# Patient Record
Sex: MALE | Race: WHITE | NOT HISPANIC OR LATINO | Employment: FULL TIME | URBAN - METROPOLITAN AREA
[De-identification: names, ages, dates, MRNs, and addresses within clinical notes are randomized per-mention and may not be internally consistent; named-entity substitution may affect disease eponyms.]

---

## 2021-04-17 ENCOUNTER — IMMUNIZATION (OUTPATIENT)
Dept: PRIMARY CARE CLINIC | Facility: CLINIC | Age: 46
End: 2021-04-17

## 2021-04-17 DIAGNOSIS — Z23 NEED FOR VACCINATION: Primary | ICD-10-CM

## 2021-04-17 PROCEDURE — 91300 PR SARS-COV- 2 COVID-19 VACCINE, NO PRSV, 30MCG/0.3ML, IM: ICD-10-PCS | Mod: S$GLB,,, | Performed by: INTERNAL MEDICINE

## 2021-04-17 PROCEDURE — 0001A PR IMMUNIZ ADMIN, SARS-COV-2 COVID-19 VACC, 30MCG/0.3ML, 1ST DOSE: ICD-10-PCS | Mod: CV19,S$GLB,, | Performed by: INTERNAL MEDICINE

## 2021-04-17 PROCEDURE — 0001A PR IMMUNIZ ADMIN, SARS-COV-2 COVID-19 VACC, 30MCG/0.3ML, 1ST DOSE: CPT | Mod: CV19,S$GLB,, | Performed by: INTERNAL MEDICINE

## 2021-04-17 PROCEDURE — 91300 PR SARS-COV- 2 COVID-19 VACCINE, NO PRSV, 30MCG/0.3ML, IM: CPT | Mod: S$GLB,,, | Performed by: INTERNAL MEDICINE

## 2021-04-17 RX ADMIN — Medication 0.3 ML: at 03:04

## 2021-05-08 ENCOUNTER — IMMUNIZATION (OUTPATIENT)
Dept: PRIMARY CARE CLINIC | Facility: CLINIC | Age: 46
End: 2021-05-08

## 2021-05-08 DIAGNOSIS — Z23 NEED FOR VACCINATION: Primary | ICD-10-CM

## 2021-05-08 PROCEDURE — 0002A PR IMMUNIZ ADMIN, SARS-COV-2 COVID-19 VACC, 30MCG/0.3ML, 2ND DOSE: ICD-10-PCS | Mod: CV19,S$GLB,, | Performed by: INTERNAL MEDICINE

## 2021-05-08 PROCEDURE — 91300 PR SARS-COV- 2 COVID-19 VACCINE, NO PRSV, 30MCG/0.3ML, IM: ICD-10-PCS | Mod: S$GLB,,, | Performed by: INTERNAL MEDICINE

## 2021-05-08 PROCEDURE — 0002A PR IMMUNIZ ADMIN, SARS-COV-2 COVID-19 VACC, 30MCG/0.3ML, 2ND DOSE: CPT | Mod: CV19,S$GLB,, | Performed by: INTERNAL MEDICINE

## 2021-05-08 PROCEDURE — 91300 PR SARS-COV- 2 COVID-19 VACCINE, NO PRSV, 30MCG/0.3ML, IM: CPT | Mod: S$GLB,,, | Performed by: INTERNAL MEDICINE

## 2021-05-08 RX ADMIN — Medication 0.3 ML: at 08:05

## 2023-11-20 ENCOUNTER — OFFICE VISIT (OUTPATIENT)
Dept: PRIMARY CARE CLINIC | Facility: CLINIC | Age: 48
End: 2023-11-20
Payer: COMMERCIAL

## 2023-11-20 ENCOUNTER — HOSPITAL ENCOUNTER (OUTPATIENT)
Dept: RADIOLOGY | Facility: HOSPITAL | Age: 48
Discharge: HOME | End: 2023-11-20
Attending: STUDENT IN AN ORGANIZED HEALTH CARE EDUCATION/TRAINING PROGRAM
Payer: COMMERCIAL

## 2023-11-20 VITALS
DIASTOLIC BLOOD PRESSURE: 72 MMHG | WEIGHT: 174.81 LBS | TEMPERATURE: 98 F | HEART RATE: 48 BPM | SYSTOLIC BLOOD PRESSURE: 100 MMHG | OXYGEN SATURATION: 99 % | BODY MASS INDEX: 25.89 KG/M2 | HEIGHT: 69 IN

## 2023-11-20 DIAGNOSIS — G89.29 CHRONIC RIGHT SHOULDER PAIN: ICD-10-CM

## 2023-11-20 DIAGNOSIS — G89.29 CHRONIC RIGHT SHOULDER PAIN: Primary | ICD-10-CM

## 2023-11-20 DIAGNOSIS — M54.12 CERVICAL RADICULOPATHY: ICD-10-CM

## 2023-11-20 DIAGNOSIS — M25.511 CHRONIC RIGHT SHOULDER PAIN: ICD-10-CM

## 2023-11-20 DIAGNOSIS — M25.511 CHRONIC RIGHT SHOULDER PAIN: Primary | ICD-10-CM

## 2023-11-20 PROCEDURE — 99999 PR PBB SHADOW E&M-EST. PATIENT-LVL V: ICD-10-PCS | Mod: PBBFAC,,, | Performed by: STUDENT IN AN ORGANIZED HEALTH CARE EDUCATION/TRAINING PROGRAM

## 2023-11-20 PROCEDURE — 72040 X-RAY EXAM NECK SPINE 2-3 VW: CPT | Mod: 26,,, | Performed by: RADIOLOGY

## 2023-11-20 PROCEDURE — 73030 XR SHOULDER COMPLETE 2 OR MORE VIEWS RIGHT: ICD-10-PCS | Mod: 26,RT,, | Performed by: RADIOLOGY

## 2023-11-20 PROCEDURE — 73030 X-RAY EXAM OF SHOULDER: CPT | Mod: TC,PN,RT

## 2023-11-20 PROCEDURE — 3008F PR BODY MASS INDEX (BMI) DOCUMENTED: ICD-10-PCS | Mod: CPTII,S$GLB,, | Performed by: STUDENT IN AN ORGANIZED HEALTH CARE EDUCATION/TRAINING PROGRAM

## 2023-11-20 PROCEDURE — 73030 X-RAY EXAM OF SHOULDER: CPT | Mod: 26,RT,, | Performed by: RADIOLOGY

## 2023-11-20 PROCEDURE — 3074F PR MOST RECENT SYSTOLIC BLOOD PRESSURE < 130 MM HG: ICD-10-PCS | Mod: CPTII,S$GLB,, | Performed by: STUDENT IN AN ORGANIZED HEALTH CARE EDUCATION/TRAINING PROGRAM

## 2023-11-20 PROCEDURE — 1159F PR MEDICATION LIST DOCUMENTED IN MEDICAL RECORD: ICD-10-PCS | Mod: CPTII,S$GLB,, | Performed by: STUDENT IN AN ORGANIZED HEALTH CARE EDUCATION/TRAINING PROGRAM

## 2023-11-20 PROCEDURE — 3074F SYST BP LT 130 MM HG: CPT | Mod: CPTII,S$GLB,, | Performed by: STUDENT IN AN ORGANIZED HEALTH CARE EDUCATION/TRAINING PROGRAM

## 2023-11-20 PROCEDURE — 3078F PR MOST RECENT DIASTOLIC BLOOD PRESSURE < 80 MM HG: ICD-10-PCS | Mod: CPTII,S$GLB,, | Performed by: STUDENT IN AN ORGANIZED HEALTH CARE EDUCATION/TRAINING PROGRAM

## 2023-11-20 PROCEDURE — 3078F DIAST BP <80 MM HG: CPT | Mod: CPTII,S$GLB,, | Performed by: STUDENT IN AN ORGANIZED HEALTH CARE EDUCATION/TRAINING PROGRAM

## 2023-11-20 PROCEDURE — 72040 X-RAY EXAM NECK SPINE 2-3 VW: CPT | Mod: TC,PN

## 2023-11-20 PROCEDURE — 99214 OFFICE O/P EST MOD 30 MIN: CPT | Mod: S$GLB,,, | Performed by: STUDENT IN AN ORGANIZED HEALTH CARE EDUCATION/TRAINING PROGRAM

## 2023-11-20 PROCEDURE — 3008F BODY MASS INDEX DOCD: CPT | Mod: CPTII,S$GLB,, | Performed by: STUDENT IN AN ORGANIZED HEALTH CARE EDUCATION/TRAINING PROGRAM

## 2023-11-20 PROCEDURE — 99999 PR PBB SHADOW E&M-EST. PATIENT-LVL V: CPT | Mod: PBBFAC,,, | Performed by: STUDENT IN AN ORGANIZED HEALTH CARE EDUCATION/TRAINING PROGRAM

## 2023-11-20 PROCEDURE — 1159F MED LIST DOCD IN RCRD: CPT | Mod: CPTII,S$GLB,, | Performed by: STUDENT IN AN ORGANIZED HEALTH CARE EDUCATION/TRAINING PROGRAM

## 2023-11-20 PROCEDURE — 72040 XR CERVICAL SPINE 2 OR 3 VIEWS: ICD-10-PCS | Mod: 26,,, | Performed by: RADIOLOGY

## 2023-11-20 PROCEDURE — 99214 PR OFFICE/OUTPT VISIT, EST, LEVL IV, 30-39 MIN: ICD-10-PCS | Mod: S$GLB,,, | Performed by: STUDENT IN AN ORGANIZED HEALTH CARE EDUCATION/TRAINING PROGRAM

## 2023-11-20 RX ORDER — BUPROPION HYDROCHLORIDE 150 MG/1
150 TABLET ORAL DAILY
Qty: 7 TABLET | Refills: 0 | Status: SHIPPED | OUTPATIENT
Start: 2023-11-20 | End: 2023-11-20 | Stop reason: ALTCHOICE

## 2023-11-20 RX ORDER — BUPRENORPHINE HYDROCHLORIDE, NALOXONE HYDROCHLORIDE 8; 2 MG/1; MG/1
FILM, SOLUBLE BUCCAL; SUBLINGUAL
COMMUNITY
Start: 2023-08-25

## 2023-11-20 RX ORDER — SILDENAFIL 25 MG/1
25 TABLET, FILM COATED ORAL DAILY PRN
COMMUNITY
Start: 2023-08-25

## 2023-11-20 RX ORDER — GABAPENTIN 800 MG/1
400 TABLET ORAL DAILY
COMMUNITY
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
## 2023-11-24 ENCOUNTER — OFFICE VISIT (OUTPATIENT)
Dept: SPORTS MEDICINE | Facility: CLINIC | Age: 48
End: 2023-11-24
Payer: COMMERCIAL

## 2023-11-24 VITALS
WEIGHT: 176.38 LBS | SYSTOLIC BLOOD PRESSURE: 118 MMHG | HEART RATE: 83 BPM | HEIGHT: 69 IN | DIASTOLIC BLOOD PRESSURE: 53 MMHG | BODY MASS INDEX: 26.12 KG/M2

## 2023-11-24 DIAGNOSIS — M54.2 CERVICALGIA: ICD-10-CM

## 2023-11-24 DIAGNOSIS — M54.12 CERVICAL RADICULOPATHY: ICD-10-CM

## 2023-11-24 DIAGNOSIS — M54.12 CERVICAL RADICULOPATHY AT C6: Primary | ICD-10-CM

## 2023-11-24 DIAGNOSIS — G89.29 CHRONIC RIGHT SHOULDER PAIN: ICD-10-CM

## 2023-11-24 DIAGNOSIS — M25.511 CHRONIC RIGHT SHOULDER PAIN: ICD-10-CM

## 2023-11-24 PROCEDURE — 3074F PR MOST RECENT SYSTOLIC BLOOD PRESSURE < 130 MM HG: ICD-10-PCS | Mod: CPTII,S$GLB,, | Performed by: ORTHOPAEDIC SURGERY

## 2023-11-24 PROCEDURE — 3008F PR BODY MASS INDEX (BMI) DOCUMENTED: ICD-10-PCS | Mod: CPTII,S$GLB,, | Performed by: ORTHOPAEDIC SURGERY

## 2023-11-24 PROCEDURE — 3078F PR MOST RECENT DIASTOLIC BLOOD PRESSURE < 80 MM HG: ICD-10-PCS | Mod: CPTII,S$GLB,, | Performed by: ORTHOPAEDIC SURGERY

## 2023-11-24 PROCEDURE — 3074F SYST BP LT 130 MM HG: CPT | Mod: CPTII,S$GLB,, | Performed by: ORTHOPAEDIC SURGERY

## 2023-11-24 PROCEDURE — 99999 PR PBB SHADOW E&M-EST. PATIENT-LVL IV: CPT | Mod: PBBFAC,,, | Performed by: ORTHOPAEDIC SURGERY

## 2023-11-24 PROCEDURE — 1159F MED LIST DOCD IN RCRD: CPT | Mod: CPTII,S$GLB,, | Performed by: ORTHOPAEDIC SURGERY

## 2023-11-24 PROCEDURE — 3008F BODY MASS INDEX DOCD: CPT | Mod: CPTII,S$GLB,, | Performed by: ORTHOPAEDIC SURGERY

## 2023-11-24 PROCEDURE — 99999 PR PBB SHADOW E&M-EST. PATIENT-LVL IV: ICD-10-PCS | Mod: PBBFAC,,, | Performed by: ORTHOPAEDIC SURGERY

## 2023-11-24 PROCEDURE — 1159F PR MEDICATION LIST DOCUMENTED IN MEDICAL RECORD: ICD-10-PCS | Mod: CPTII,S$GLB,, | Performed by: ORTHOPAEDIC SURGERY

## 2023-11-24 PROCEDURE — 99204 PR OFFICE/OUTPT VISIT, NEW, LEVL IV, 45-59 MIN: ICD-10-PCS | Mod: S$GLB,,, | Performed by: ORTHOPAEDIC SURGERY

## 2023-11-24 PROCEDURE — 99204 OFFICE O/P NEW MOD 45 MIN: CPT | Mod: S$GLB,,, | Performed by: ORTHOPAEDIC SURGERY

## 2023-11-24 PROCEDURE — 3078F DIAST BP <80 MM HG: CPT | Mod: CPTII,S$GLB,, | Performed by: ORTHOPAEDIC SURGERY

## 2023-11-24 NOTE — PROGRESS NOTES
NEW PATIENT    HISTORY OF PRESENT ILLNESS   48 y.o. Male with a history of right shoulder pain who works as an [employer] . He reports shoulder and neck pain for about a month now. He reports radicular symptoms into his hand. He reports weakness in the right shoulder. He does have pain at work and at night. He denies any previous shoulder injury. He has been going to a chiropractor with some relief.   He states he is developed a lot of weakness in his arm.  The pain will radiate down to the forearm.    Is affecting ADLs.  Pain is 5/10 at it's worst.        PAST MEDICAL HISTORY    Past Medical History:   Diagnosis Date    Depression     Hepatitis C     Heroin abuse 7/12/2014    History of psychiatric hospitalization     Hx of psychiatric care     IVDU (intravenous drug user)     Microcytic anemia 7/13/2014    Psychiatric problem     Self-harming behavior     Suicide attempt     Therapy        PAST SURGICAL HISTORY     Past Surgical History:   Procedure Laterality Date    TONSILLECTOMY         FAMILY HISTORY    Family History   Problem Relation Age of Onset    Mental illness Mother     Heart disease Father     Kidney disease Father     Depression Father     Arthritis Sister     No Known Problems Maternal Aunt     No Known Problems Paternal Aunt     Early death Paternal Uncle     Heart disease Paternal Uncle     Arthritis Maternal Grandmother     Diabetes Maternal Grandfather     Birth defects Paternal Grandmother     Diabetes Paternal Grandmother     Early death Paternal Grandfather        SOCIAL HISTORY    Social History     Socioeconomic History    Marital status: Single   Tobacco Use    Smoking status: Every Day     Current packs/day: 1.00     Average packs/day: 1 pack/day for 10.0 years (10.0 ttl pk-yrs)     Types: Cigarettes   Substance and Sexual Activity    Alcohol use: Yes     Comment: fifth per day    Drug use: Yes     Types: IV     Comment: heroin and benzo     Sexual activity: Yes     Partners: Female    Other Topics Concern    Patient feels they ought to cut down on drinking/drug use Yes    Patient annoyed by others criticizing their drinking/drug use Yes    Patient has felt bad or guilty about drinking/drug use Yes    Patient has had a drink/used drugs as an eye opener in the AM Yes     Social Determinants of Health     Financial Resource Strain: Low Risk  (11/19/2023)    Overall Financial Resource Strain (CARDIA)     Difficulty of Paying Living Expenses: Not very hard   Food Insecurity: No Food Insecurity (11/19/2023)    Hunger Vital Sign     Worried About Running Out of Food in the Last Year: Never true     Ran Out of Food in the Last Year: Never true   Transportation Needs: No Transportation Needs (11/19/2023)    PRAPARE - Transportation     Lack of Transportation (Medical): No     Lack of Transportation (Non-Medical): No   Physical Activity: Unknown (11/19/2023)    Exercise Vital Sign     Days of Exercise per Week: 7 days   Stress: Stress Concern Present (11/19/2023)    [place] [employer] - Occupational Stress Questionnaire     Feeling of Stress : Very much   Social Connections: Unknown (11/19/2023)    Social Connection and Isolation Panel [NHANES]     Frequency of Communication with Friends and Family: More than three times a week     Frequency of Social Gatherings with Friends and Family: Twice a week     Active Member of Clubs or Organizations: Yes     Attends Club or Organization Meetings: Never     Marital Status: Never    Housing Stability: Low Risk  (11/19/2023)    Housing Stability Vital Sign     Unable to Pay for Housing in the Last Year: No     Number of Places Lived in the Last Year: 1     Unstable Housing in the Last Year: No       MEDICATIONS      Current Outpatient Medications:     gabapentin (NEURONTIN) 800 MG tablet, Take 400 mg by mouth once daily., Disp: , Rfl:     sildenafiL (VIAGRA) 25 MG tablet, Take 25 mg by mouth daily as needed., Disp: , Rfl:     SUBOXONE  "8-2 mg, SMARTSI Strip(s) Sublingual Every Morning, Disp: , Rfl:     ALLERGIES     Review of patient's allergies indicates:   Allergen Reactions    Iodinated contrast media Nausea And Vomiting         REVIEW OF SYSTEMS   Constitution: Negative. Negative for chills, fever and night sweats.   HENT: Negative for congestion and headaches.    Eyes: Negative for blurred vision, left vision loss and right vision loss.   Cardiovascular: Negative for chest pain and syncope.   Respiratory: Negative for cough and shortness of breath.    Endocrine: Negative for polydipsia, polyphagia and polyuria.   Hematologic/Lymphatic: Negative for bleeding problem. Does not bruise/bleed easily.   Skin: Negative for dry skin, itching and rash.   Musculoskeletal: Negative for falls. Positive for right shoulder pain  Gastrointestinal: Negative for abdominal pain and bowel incontinence.   Genitourinary: Negative for bladder incontinence and nocturia.   Neurological: Negative for disturbances in coordination, loss of balance and seizures.   Psychiatric/Behavioral: Negative for depression. The patient does not have insomnia.    Allergic/Immunologic: Negative for hives and persistent infections.     PHYSICAL EXAMINATION    Vitals: BP (!) 118/53   Pulse 83   Ht 5' 9" (1.753 m)   Wt 80 kg (176 lb 5.9 oz)   BMI 26.05 kg/m²     General: The patient appears active and healthy with no apparent physical problems.  No disturbance of mood or affect is demonstrated. Alert and Oriented.    HEENT: Eyes normal, pupils equally round, nose normal.    Resp: Equal and symmetrical chest rises. No wheezing    CV: Regular rate    Neck: Supple; nonpainful range of motion.    Extremities: no cyanosis, clubbing, edema, or diffuse swelling.  Palpable pulses, good capillary refill of the digits.  No coolness, discoloration, edema or obvious varicosities.    Skin: no lesions noted.    Lymphatic: no detected adenopathy in the upper or lower extremities.    Neurologic: " normal mental status, normal reflexes, normal gait and balance.  Patient is alert and oriented to person, place and time.  No flaccidity or spasticity is noted.  No motor or sensory deficits are noted.  Light touch is intact    Orthopaedic:     SHOULDER EXAM - RIGHT    Inspection:   Normal skin color and appearance with no scars.  No muscle atrophy noted.  No scapular winging.      Palpation: No tenderness of the acromioclavicular joint, lateral edge of the acromion, biceps tendon, trapezius muscle or scapulothoracic bursa.      ROM:      PROM:     FE - 180°    Abd/ER -  90°  Abd/IR -  45°   Add/ER -  60°     AROM:    FE - 180°    Abd/ER -  90°  Abd/IR -  45°   Add/ER -  60°         Tests:     - Schneider, - Neer's, - Cross Arm Adduction, - Conway, - Yerguson, - Speed. - Belly Press,  - Jobes, - Lift Off    Stability: - sulcus, - apprehension, - relocation, - load and shift, - DLS      Motor:  Rotator cuff strength is 5/5 supraspinatus, 5/5 infraspinatus, 5/5 subscapularis. Biceps, triceps and deltoid strength is 5/5.      Neuro     Distally there are no paresthesias, and sensation is intact to light touch in the median, ulnar, and radial distributions.  Reflexes are 2/2 biceps, triceps and brachioradialis.      CERVICAL SPINE    Inspection:   No deformity of the cervical spine.      Palpation:    No tenderness to palpation.  No muscle spasm is evident.  Alignment appears anatomic.      ROM:   Limited c-spine ROM especially extension  No reproduction of symptoms with hyperextension, side bending or cervical spine   rotation.  No long tract signs.  Reflex, motor and sensory exam within normal limits in the bilateral upper extremities     Weakness with EPL and wrist extensors muscle testing    Tests: Negative Spurlings   Negative Lhermitte's Sign.       IMAGING    X-Ray Cervical Spine 2 or 3 Views  Narrative: EXAMINATION:  XR CERVICAL SPINE 2 OR 3 VIEWS    CLINICAL HISTORY:  Radiculopathy, cervical  region    TECHNIQUE:  AP, lateral and open mouth views of the cervical spine were performed.    COMPARISON:  None.    FINDINGS:  Vertebral bodies are intact.  There is narrowing of the C 4-C5 and C5-C6 disc spaces.  Bony spurring is present particularly at C5-C6 level.  Impression: See above    Electronically signed by: Joshua Martinez MD  Date:    11/20/2023  Time:    09:01  X-ray Shoulder 2 or More Views Right  Narrative: EXAMINATION:  XR SHOULDER COMPLETE 2 OR MORE VIEWS RIGHT    CLINICAL HISTORY:  Pain in right shoulder    TECHNIQUE:  Two or three views of the right shoulder were performed.    COMPARISON:  None    FINDINGS:  No fracture or dislocation.  No bone destruction identified.  No abnormal soft tissue calcifications.  Impression: See above    Electronically signed by: Jimbo Keith MD  Date:    11/20/2023  Time:    09:01        IMPRESSION       ICD-10-CM ICD-9-CM   1. Cervical radiculopathy at C6  M54.12 723.4   2. Chronic right shoulder pain  M25.511 719.41    G89.29 338.29   3. Cervical radiculopathy  M54.12 723.4   4. Cervicalgia  M54.2 723.1       MEDICATIONS PRESCRIBED      Medrol dose pack    RECOMMENDATIONS     Referral to physical therapy placed today  MRI of cervical spine ordered today  Referral to Dr. Jose Lou after MRI      All questions were answered, pt will contact us for questions or concerns in the interim.

## 2023-12-06 ENCOUNTER — CLINICAL SUPPORT (OUTPATIENT)
Dept: REHABILITATION | Facility: HOSPITAL | Age: 48
End: 2023-12-06
Attending: ORTHOPAEDIC SURGERY
Payer: COMMERCIAL

## 2023-12-06 DIAGNOSIS — M54.12 CERVICAL RADICULOPATHY AT C6: ICD-10-CM

## 2023-12-06 DIAGNOSIS — M25.611 DECREASED RANGE OF MOTION OF SHOULDER, RIGHT: ICD-10-CM

## 2023-12-06 DIAGNOSIS — R29.898 DECREASED STRENGTH OF UPPER EXTREMITY: ICD-10-CM

## 2023-12-06 DIAGNOSIS — R29.3 POSTURE ABNORMALITY: Primary | ICD-10-CM

## 2023-12-06 PROCEDURE — 97162 PT EVAL MOD COMPLEX 30 MIN: CPT

## 2023-12-06 PROCEDURE — 97112 NEUROMUSCULAR REEDUCATION: CPT

## 2023-12-14 ENCOUNTER — CLINICAL SUPPORT (OUTPATIENT)
Dept: REHABILITATION | Facility: HOSPITAL | Age: 48
End: 2023-12-14
Payer: COMMERCIAL

## 2023-12-14 DIAGNOSIS — R29.898 DECREASED STRENGTH OF UPPER EXTREMITY: ICD-10-CM

## 2023-12-14 DIAGNOSIS — M25.611 DECREASED RANGE OF MOTION OF SHOULDER, RIGHT: ICD-10-CM

## 2023-12-14 DIAGNOSIS — R29.3 POSTURE ABNORMALITY: Primary | ICD-10-CM

## 2023-12-14 PROCEDURE — 97140 MANUAL THERAPY 1/> REGIONS: CPT

## 2023-12-14 PROCEDURE — 97112 NEUROMUSCULAR REEDUCATION: CPT

## 2023-12-14 NOTE — PROGRESS NOTES
[facility]   Physical Therapy Treatment Note      Name: Cem Hull  Clinic Number: [id number]    Therapy Diagnosis:   Encounter Diagnoses   Name Primary?    Posture abnormality Yes    Decreased range of motion of shoulder, right     Decreased strength of upper extremity      Physician: Glenda Xiong MD    Visit Date: 12/14/2023    Physician Orders: PT Eval and Treat   Medical Diagnosis from Referral: Cervical radiculopathy at C6 [M54.12]   Evaluation Date: 12/6/2023  Authorization Period Expiration: 11/23/2024  Plan of Care Expiration: 3/10/2024  Progress Note Due: 1/10/2024  Visit # / Visits authorized: 1/20   FOTO: 1/3     Precautions: Standard      Time In: 15:00  Time Out: 15:54  Total Appointment Time (timed & untimed codes): 54 minutes    PTA Visit #: 0/5     Subjective     Pt reports: was doing some exercises. Felt like she helped with my shoulder position last visit.   He was somewhat compliant with home exercise program.  Response to previous treatment: improved pain  Functional change: improved neck range    Pain: 5/10  Location: right arms and neck      Objective      Objective Measures updated at progress report unless specified.     Observation: muscle atrophy noticed along right UT, supraspinatus, infraspinatus, and slightly decreased on right deltoid and bicep. No atrophy along right forearm/hand.     :   - left  - 100, 94, 93 lbs  - right - 100, 82, 72 lbs           Myotomes Right Left Comments   C2 5/5 5/5     C3 5/5 5/5     C4 5/5 5/5     C5 5/5 5/5     C6 4/5 5/5     C7 5/5 5/5     C8 4/5 5/5    T1 5/5 5/5      Shoulder AROM right   - flex 100*  - scaption 120*    Treatment     Cem received the treatments listed below:      therapeutic exercises to develop strength, endurance, and ROM for 0 minutes including:      manual therapy techniques: Joint mobilizations were applied to the: shoulder, thoracic spine for 10 minutes, including:  Supine thoracic  "HVLAT  Prone CT junction mobilizations    neuromuscular re-education activities to improve: Proprioception, Posture, and motor control for 44 minutes. The following activities were included:  Assessment  UT activation 10x5"  Wall slide with UT activation 2x10x3" - modification for neck position  Modified open books on right UE 2x10  Standing ER yellow TB with right UT activation 2x10  No money yellow TB with right UT activation  Pt education   HEP    therapeutic activities to improve functional performance for 0  minutes, including:      Patient Education and Home Exercises       Education provided:   - PT POC, Prognosis, and HEP   - neck position  - reducing neural mobility    Written Home Exercises Provided: YES. Exercises were reviewed and Cem was able to demonstrate them prior to the end of the session.  Cem demonstrated good understanding of the education provided. See EMR under Patient Instructions for exercises provided during therapy sessions    Assessment     Cem having symptoms into right UE and assessment showed muscle atrophy along proximal muscles.  strength showed progressing weakness and fatigue. Responded to manual techniques and improved shoulder AROM and no symptoms distal to shoulder following and with exercises. Modification with wall slides for cervical positioning and arm position for open books. Added shoulder stability exercises and using UT during activation. Education on scapular positioning and also return to gym activities for LE. Will continue to progress.     Cem Is progressing well towards his goals.   Pt prognosis is Good.     Pt will continue to benefit from skilled outpatient physical therapy to address the deficits listed in the problem list box on initial evaluation, provide pt/family education and to maximize pt's level of independence in the home and community environment.     Pt's spiritual, cultural and educational needs considered and pt agreeable to plan of care and " goals.     Anticipated barriers to physical therapy: Scheduling, Financial, Chronicity of symptoms     Goals:  Short Term Goals: 4-6 weeks - progressing  Patient will be independent in initial HEP to help supplement PT.  Patient will improve AROM shoulder flexion to >/= 150 degrees to help with reaching/lifting.   Patient will improve pain at its worst to </= 5/10 to help improve quality of life.   Patient will improve cervical rotation to >/= 70 degrees to help with driving.      Long Term Goals: 10-12 weeks - progressing  Patient will be independent in updated HEP to help supplement PT and maintain gains made in PT.  Patient will improve FOTO score to >/= predicted (64) to show improvement in condition.   Patient will improve pain at its worst to </= 2/10 to help improve quality of life.  Patient will improve serratus activation to >/= 4-/5 to help with reaching overhead.   Patient will improve shoulder AROM to pain free to help with job duties.     Plan     Continue with CAMRON Jefferson, PT, DPT, OCS

## 2023-12-14 NOTE — PLAN OF CARE
[facility]   Physical Therapy Initial Evaluation      Name: Cem Hull  Clinic Number: [id number]    Therapy Diagnosis:   Encounter Diagnoses   Name Primary?    Cervical radiculopathy at C6     Posture abnormality Yes    Decreased range of motion of shoulder, right     Decreased strength of upper extremity      Physician: Glenda Xiong MD     Physician Orders: PT Eval and Treat   Medical Diagnosis from Referral: Cervical radiculopathy at C6 [M54.12]   Evaluation Date: 12/6/2023  Authorization Period Expiration: 11/23/2024  Plan of Care Expiration: 3/10/2024  Progress Note Due: 1/10/2024  Visit # / Visits authorized: 1/1   FOTO: 1/3    Precautions: Standard     Time In: 3:55 pm   Time Out: 4:50 pm   Total Appointment Time (timed & untimed codes): 55 minutes    Subjective     Date of onset: Started about a year ago    History of current condition - Cem reports: Starting about a year ago he started getting sharp pain into his right arm and feels almost like a massive knot in his shoulder area. He gets burning, throbbing and numbness down into his arm. He reports having some nerve damage in his neck. He has increased difficulty with lifting up overhead. He was going to a chiropractor but was told to stop. It helped a little but would come right back. The pins and needles in his arm and down to around the elbow and then into his wrist as well as pec and lat area. He has difficulty gripping things and feels like he is losing strength and atrophying. He has difficulty sleeping as well. Icing helps some and warm showers as well as if he sidebends his neck to the left. 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     Imaging:   X-Ray Cervical (11/20/2023):  FINDINGS:  Vertebral bodies are intact.  There is narrowing of the C 4-C5 and C5-C6 disc spaces.  Bony spurring is present particularly at C5-C6 level.    X-Ray Shoulder  (11/20/2023):  FINDINGS:  No fracture or dislocation.  No bone destruction identified.  No abnormal soft tissue calcifications.    Prior Therapy: No  Social History: lives alone   Occupation: Works doing AC work   Prior Level of Function: Prior to this no issues in his arm just occasional tightness in his neck. Independent in all ADLs.    Current Level of Function: Difficulty sleeping, lifting and reaching with pain in his neck and arm/shoulder.      Pain:  Current 5/10, worst 9/10, best 4/10   Location: right arms and neck    Description: Burning, Throbbing, Tight, Tingling, Numb, and Sharp  Aggravating Factors: Laying, Flexing, and Lifting  Easing Factors: ice and hot bath    Patients goals: Keep the pain managemeble to wear he can actually lift and  things.      Medical History:   Past Medical History:   Diagnosis Date    Depression     Hepatitis C     Heroin abuse 7/12/2014    History of psychiatric hospitalization     Hx of psychiatric care     IVDU (intravenous drug user)     Microcytic anemia 7/13/2014    Psychiatric problem     Self-harming behavior     Suicide attempt     Therapy      Surgical History:   Cem JEANNETTE Kurtis  has a past surgical history that includes Tonsillectomy.    Medications:   Cem has a current medication list which includes the following prescription(s): gabapentin, sildenafil, and suboxone.    Allergies:   Review of patient's allergies indicates:   Allergen Reactions    Iodinated contrast media Nausea And Vomiting        Objective      Observation: Patient presents with an overall pleasant general affect noticeably in pain and agitated with movement.     Posture: Static posture displays noticeable chin tilt, depressed right scapula, noticeable hinge point in mid-cervical spine, and rounded shoulders. Would benefit from further evaluation at follow-ups to further assess any atrophy.     Dermatomes:    Right Left    C4 Intact Intact    C5 Intact Intact   C6 Intact Intact   C7 Intact  Intact   C8 Intact Intact   T1 Intact Intact     Myotomes:    Right Left    C4 5/5 5/5   C5 5/5 5/5   C6 4/5 5/5   C7 4/5 5/5   C8 4/5 5/5   T1 5/5 5/5     Reflexes:    Right Left    Biceps  3+ 2+   Brachioradialis 2+ 2+   Triceps  2+ 2+     Range of Motion:  Cervical   Degrees  Pain    Flexion 50 degrees  Pain   Extension 20 degrees  Pain    Right Sidebend 20 degrees  Pain   Left Sidebend 20 degrees  Pain   Right Rotation 65 degrees  Pain at end range    Left Rotation  50 degrees  Pain tight on the right      Shoulder   Right AROM/PROM Left AROM/PROM    Flexion 95 degrees* / 130 degrees* 180 degrees / 180 degrees    Scaption NT NT   Abduction 130 degrees* / 140 degrees* 180 degrees / 180 degrees    ER at 0 45 degrees* / NT 65 degrees / NT   ER at 45 NT NT   ER at 90 70 degrees* / 80 degrees* 90 degrees / 90 degrees    IR at 90 20 degrees* / 30 degrees* 35 degrees / 40 degrees    Functional IR  NT NT      Upper Extremity Strength (MMT):   Right Left    Shoulder Flexion 3/5 4/5   Shoulder Abduction 3/5 4/5   ER at 0 3/5 4-/5   IR at 0 3/5 4/5   Serratus Anterior 3-/5 3/5   Upper Trap 3-/5 3+/5   Middle Trap 3-/5 3/5   Lower Trap  3-/5 3-/5     Joint Mobility: decreased upper cervical flexion mobility, decreased cervical side glides left to right, decreased thoracic PA joint mobility and GHJ AP joint mobility on right.     Palpation: tenderness with increased pressure cervical paraspinals and upper trap area.     Special Tests:   - Spurling's: (+) for pain but negative for reproduction of symptoms into arm  - Distraction: (+) improvement in pain   - Cervical Rotation <60: (-)   - ULTTA: (+) on R, (-) on L  - Inverted Supinator: (-)  - Gait Instability: Patient denies  - Infante's: NT  - Babinski: NT  - Transverse Ligament Stress Test: (-)   - Sharp Wilton's: (-)    Intake Outcome Measure for FOTO Neck Survey    Therapist reviewed FOTO scores for Cem Hull on 12/6/2023.   FOTO documents entered into [employer] - see  "Media section.    Intake Score: 50%          Treatment     Total Treatment time (time-based codes) separate from Evaluation: 15 minutes     Cem received the treatments listed below:      therapeutic exercises to develop strength, endurance, ROM, flexibility, posture, and core stabilization for 00 minutes including:      manual therapy techniques: Joint mobilizations and Soft tissue Mobilization were applied to the: Cervical, thoracic for 00 minutes, including:      neuromuscular re-education activities to improve: Coordination, Kinesthetic, Sense, Proprioception, and Posture for 15 minutes. The following activities were included:    Pt education on PT POC, Prognosis, and HEP   Supine chin tucks with chin tilt correction 1 x 10 x 5" holds   Seated thoracic extensions 1 x 10  x 3" holds   Wall slides 1 x 10 reps   Upper trap holds level I 1 x 10 x 5" holds     therapeutic activities to improve functional performance for 00  minutes, including:      Patient Education and Home Exercises     Education provided:   - PT POC, Prognosis, and HEP    Written Home Exercises Provided: yes. Exercises were reviewed and Cem was able to demonstrate them prior to the end of the session.  Cem demonstrated good  understanding of the education provided. See EMR under Patient Instructions for exercises provided during therapy sessions.    Assessment     Cem is a 48 y.o. male referred to outpatient Physical Therapy with a medical diagnosis of Cervical radiculopathy at C6 [M54.12] . Patient presents with decreased range of motion, decreased strength, posture abnormality, pain, adverse neural tension, and functional limitations in reaching, lifting, and sleeping. Patient would benefit from skilled PT intervention to address above stated deficits and improve overall functional mobility.     Patient prognosis is Good.   Patient will benefit from skilled outpatient Physical Therapy to address the deficits stated above and in the chart " below, provide patient /family education, and to maximize patientt's level of independence.     Plan of care discussed with patient: Yes  Patient's spiritual, cultural and educational needs considered and patient is agreeable to the plan of care and goals as stated below:     Anticipated Barriers for therapy: Scheduling, Financial, Chronicity of symptoms     Medical Necessity is demonstrated by the following  History  Co-morbidities and personal factors that may impact the plan of care [] LOW: no personal factors / co-morbidities  [] MODERATE: 1-2 personal factors / co-morbidities  [x] HIGH: 3+ personal factors / co-morbidities    Moderate / High Support Documentation: Anemia, IVDU, depression     Examination  Body Structures and Functions, activity limitations and participation restrictions that may impact the plan of care [] LOW: addressing 1-2 elements  [] MODERATE: 3+ elements  [x] HIGH: 3+ elements (please support below)    Moderate / High Support Documentation: range of motion, strength, posture, motor control, neuromuscular re-education, pain      Clinical Presentation [] LOW: stable  [x] MODERATE: Evolving  [] HIGH: Unstable     Decision Making/ Complexity Score: moderate       Goals:  Short Term Goals: 4-6 weeks   Patient will be independent in initial HEP to help supplement PT.  Patient will improve AROM shoulder flexion to >/= 150 degrees to help with reaching/lifting.   Patient will improve pain at its worst to </= 5/10 to help improve quality of life.   Patient will improve cervical rotation to >/= 70 degrees to help with driving.     Long Term Goals: 10-12 weeks   Patient will be independent in updated HEP to help supplement PT and maintain gains made in PT.  Patient will improve FOTO score to >/= predicted (64) to show improvement in condition.   Patient will improve pain at its worst to </= 2/10 to help improve quality of life.  Patient will improve serratus activation to >/= 4-/5 to help with  reaching overhead.   Patient will improve shoulder AROM to pain free to help with job duties.   Plan     Plan of care Certification: 12/6/2023 to 3/10/2024.    Outpatient Physical Therapy 1-2 times weekly for 12 weeks to include the following interventions: Electrical Stimulation , Manual Therapy, Moist Heat/ Ice, Neuromuscular Re-ed, Patient Education, Self Care, Therapeutic Activities, and Therapeutic Exercise.     Dorothy Killian, PT, DPT, OCS

## 2023-12-21 ENCOUNTER — CLINICAL SUPPORT (OUTPATIENT)
Dept: REHABILITATION | Facility: HOSPITAL | Age: 48
End: 2023-12-21
Payer: COMMERCIAL

## 2023-12-21 DIAGNOSIS — R29.3 POSTURE ABNORMALITY: Primary | ICD-10-CM

## 2023-12-21 DIAGNOSIS — M25.611 DECREASED RANGE OF MOTION OF SHOULDER, RIGHT: ICD-10-CM

## 2023-12-21 DIAGNOSIS — R29.898 DECREASED STRENGTH OF UPPER EXTREMITY: ICD-10-CM

## 2023-12-21 PROCEDURE — 97112 NEUROMUSCULAR REEDUCATION: CPT

## 2023-12-21 PROCEDURE — 97140 MANUAL THERAPY 1/> REGIONS: CPT

## 2023-12-21 NOTE — PROGRESS NOTES
[facility]   Physical Therapy Treatment Note      Name: Cem Hull  Clinic Number: [id number]    Therapy Diagnosis:   Encounter Diagnoses   Name Primary?    Posture abnormality Yes    Decreased range of motion of shoulder, right     Decreased strength of upper extremity        Physician: Glenda Xiong MD    Visit Date: 12/21/2023    Physician Orders: PT Eval and Treat   Medical Diagnosis from Referral: Cervical radiculopathy at C6 [M54.12]   Evaluation Date: 12/6/2023  Authorization Period Expiration: 11/23/2024  Plan of Care Expiration: 3/10/2024  Progress Note Due: 1/10/2024  Visit # / Visits authorized: 2/20   FOTO: 1/3     Precautions: Standard      Time In: 16:00  Time Out: 16:50  Total Appointment Time (timed & untimed codes): 50 minutes    PTA Visit #: 0/5     Subjective     Pt reports: still getting symptoms into the arm but nothing past the elbow. Pain is still significant in the neck and arm. Fatigues quickly with exercises.     He was somewhat compliant with home exercise program.  Response to previous treatment: improved pain  Functional change: improved neck range    Pain: 5/10  Location: right arms and neck      Objective      Objective Measures updated at progress report unless specified.   12/14/23  Observation: muscle atrophy noticed along right UT, supraspinatus, infraspinatus, and slightly decreased on right deltoid and bicep. No atrophy along right forearm/hand.     :   - left  - 100, 94, 93 lbs  - right - 100, 82, 72 lbs           Myotomes Right Left Comments   C2 5/5 5/5     C3 5/5 5/5     C4 5/5 5/5     C5 5/5 5/5     C6 4/5 5/5     C7 5/5 5/5     C8 4/5 5/5    T1 5/5 5/5      Shoulder AROM right   - flex 100*  - scaption 120*    Treatment     Cem received the treatments listed below:      therapeutic exercises to develop strength, endurance, and ROM for 0 minutes including:      manual therapy techniques: Joint mobilizations were applied to the:  "shoulder, thoracic spine for 10 minutes, including:  Supine thoracic HVLAT  Prone CT junction mobilizations    neuromuscular re-education activities to improve: Proprioception, Posture, and motor control for 40 minutes. The following activities were included:  Assessment  UT activation 2x10x5"  Wall slide with UT activation 2x10x3" - modification for neck position  Modified open books on right UE 2x10  Standing ER yellow TB with right UT activation 2x10  No money yellow TB with right UT activation  Pt education   HEP    therapeutic activities to improve functional performance for 0  minutes, including:      Patient Education and Home Exercises       Education provided:   - PT POC, Prognosis, and HEP   - neck position  - reducing neural mobility    Written Home Exercises Provided: YES. Exercises were reviewed and Cem was able to demonstrate them prior to the end of the session.  Cem demonstrated good understanding of the education provided. See EMR under Patient Instructions for exercises provided during therapy sessions    Assessment     Cem still having positive neural symptoms but having improvement with manual. Continued to educate about neural symptoms and positive sign of no symptoms past the right arm. Was able to tolerate increased activity with exercises but still fatigues quickly. Will continue to progress.     Cem Is progressing well towards his goals.   Pt prognosis is Good.     Pt will continue to benefit from skilled outpatient physical therapy to address the deficits listed in the problem list box on initial evaluation, provide pt/family education and to maximize pt's level of independence in the home and community environment.     Pt's spiritual, cultural and educational needs considered and pt agreeable to plan of care and goals.     Anticipated barriers to physical therapy: Scheduling, Financial, Chronicity of symptoms     Goals:  Short Term Goals: 4-6 weeks - progressing  Patient will be " independent in initial HEP to help supplement PT.  Patient will improve AROM shoulder flexion to >/= 150 degrees to help with reaching/lifting.   Patient will improve pain at its worst to </= 5/10 to help improve quality of life.   Patient will improve cervical rotation to >/= 70 degrees to help with driving.      Long Term Goals: 10-12 weeks - progressing  Patient will be independent in updated HEP to help supplement PT and maintain gains made in PT.  Patient will improve FOTO score to >/= predicted (64) to show improvement in condition.   Patient will improve pain at its worst to </= 2/10 to help improve quality of life.  Patient will improve serratus activation to >/= 4-/5 to help with reaching overhead.   Patient will improve shoulder AROM to pain free to help with job duties.     Plan     Continue with CAMRON Jefferson, PT, DPT, OCS